# Patient Record
Sex: MALE | Race: WHITE
[De-identification: names, ages, dates, MRNs, and addresses within clinical notes are randomized per-mention and may not be internally consistent; named-entity substitution may affect disease eponyms.]

---

## 2018-10-10 ENCOUNTER — HOSPITAL ENCOUNTER (EMERGENCY)
Dept: HOSPITAL 25 - UCEAST | Age: 28
Discharge: HOME | End: 2018-10-10
Payer: COMMERCIAL

## 2018-10-10 VITALS — SYSTOLIC BLOOD PRESSURE: 125 MMHG | DIASTOLIC BLOOD PRESSURE: 75 MMHG

## 2018-10-10 DIAGNOSIS — H10.33: Primary | ICD-10-CM

## 2018-10-10 DIAGNOSIS — Z72.0: ICD-10-CM

## 2018-10-10 PROCEDURE — G0463 HOSPITAL OUTPT CLINIC VISIT: HCPCS

## 2018-10-10 PROCEDURE — 99202 OFFICE O/P NEW SF 15 MIN: CPT

## 2018-10-10 NOTE — ED
Throat Pain/Nasal Congestion





- HPI Summary


HPI Summary: 





red irritated eyes beginning earlier today with discharge from both eyes. no 

photophobia, no change in vision noted. used some visine to clear the redness 

beginning today. doesnt use contact lenses 





- History of Current Complaint


Chief Complaint: UCEye


Time Seen by Provider: 10/10/18 22:08


Hx Obtained From: Patient


Onset/Duration: Sudden Onset


Severity: Moderate





- Epiglottits Risk Factors


Epiglottis Risk Factors: Negative





- Allergies/Home Medications


Allergies/Adverse Reactions: 


 Allergies











Allergy/AdvReac Type Severity Reaction Status Date / Time


 


No Known Allergies Allergy   Verified 10/10/18 22:06











Home Medications: 


 Home Medications





NK [No Home Medications Reported]  10/10/18 [History Confirmed 10/10/18]











PMH/Surg Hx/FS Hx/Imm Hx


Previously Healthy: Yes


Infectious Disease History: No


Infectious Disease History: 


   Denies: Traveled Outside the [place] in Last 30 Days





- Social History


Alcohol Use: Weekly


Substance Use Type: Reports: None


Smoking Status (MU): Current Some Day Smoker





Review of Systems


Constitutional: Negative


Eyes: Other


Positive: Drainage, Erythema


Cardiovascular: Negative


Respiratory: Negative


Gastrointestinal: Negative


All Other Systems Reviewed And Are Negative: Yes





Physical Exam


Triage Information Reviewed: Yes


Vital Signs On Initial Exam: 


 Initial Vitals











Temp Pulse Resp BP Pulse Ox


 


 36.7 C   71   12   125/75   100 


 


 10/10/18 22:00  10/10/18 22:00  10/10/18 22:00  10/10/18 22:00  10/10/18 22:00











Vital Signs Reviewed: Yes


Appearance: Positive: Well-Appearing


Skin: Positive: Warm, Dry


Eyes: Positive: EOMI, EDUARDO, Conjunctiva Inflammed


Dental: Positive: Other - tender preauricular nodes noted





Diagnostics





- Vital Signs


 Vital Signs











  Temp Pulse Resp BP Pulse Ox


 


 10/10/18 22:00  36.7 C  71  12  125/75  100














- Laboratory


Lab Statement: Any lab studies that have been ordered have been reviewed, and 

results considered in the medical decision making process.





EENT Course/Dx





- Diagnoses


Provider Diagnoses: 


 Acute conjunctivitis of both eyes








Discharge





- Sign-Out/Discharge


Documenting (check all that apply): Patient Departure


All imaging exams completed and their final reports reviewed: No Studies





- Discharge Plan


Condition: Good


Disposition: HOME


Patient Education Materials:  Conjunctivitis (ED)


Forms:  *Gen. Provider Communication


Referrals: 


Kyle Castelan MD [Primary Care Provider] - 





- Billing Disposition and Condition


Condition: GOOD


Disposition: Home